# Patient Record
Sex: FEMALE | Race: WHITE
[De-identification: names, ages, dates, MRNs, and addresses within clinical notes are randomized per-mention and may not be internally consistent; named-entity substitution may affect disease eponyms.]

---

## 2022-04-29 PROBLEM — Z00.00 ENCOUNTER FOR PREVENTIVE HEALTH EXAMINATION: Status: ACTIVE | Noted: 2022-04-29

## 2022-05-09 ENCOUNTER — TRANSCRIPTION ENCOUNTER (OUTPATIENT)
Age: 27
End: 2022-05-09

## 2022-05-09 ENCOUNTER — ASOB RESULT (OUTPATIENT)
Age: 27
End: 2022-05-09

## 2022-05-09 ENCOUNTER — APPOINTMENT (OUTPATIENT)
Dept: ANTEPARTUM | Facility: CLINIC | Age: 27
End: 2022-05-09
Payer: MEDICAID

## 2022-05-09 PROCEDURE — 76812 OB US DETAILED ADDL FETUS: CPT

## 2022-05-09 PROCEDURE — 76811 OB US DETAILED SNGL FETUS: CPT

## 2022-05-18 ENCOUNTER — ASOB RESULT (OUTPATIENT)
Age: 27
End: 2022-05-18

## 2022-05-18 ENCOUNTER — APPOINTMENT (OUTPATIENT)
Dept: ANTEPARTUM | Facility: CLINIC | Age: 27
End: 2022-05-18
Payer: MEDICAID

## 2022-05-18 PROCEDURE — 76816 OB US FOLLOW-UP PER FETUS: CPT

## 2022-05-18 PROCEDURE — 76817 TRANSVAGINAL US OBSTETRIC: CPT

## 2022-06-09 ENCOUNTER — ASOB RESULT (OUTPATIENT)
Age: 27
End: 2022-06-09

## 2022-06-09 ENCOUNTER — APPOINTMENT (OUTPATIENT)
Dept: ANTEPARTUM | Facility: CLINIC | Age: 27
End: 2022-06-09
Payer: MEDICAID

## 2022-06-09 PROCEDURE — 76817 TRANSVAGINAL US OBSTETRIC: CPT

## 2022-06-09 PROCEDURE — 76816 OB US FOLLOW-UP PER FETUS: CPT

## 2022-07-07 ENCOUNTER — APPOINTMENT (OUTPATIENT)
Dept: ANTEPARTUM | Facility: CLINIC | Age: 27
End: 2022-07-07

## 2022-07-07 ENCOUNTER — ASOB RESULT (OUTPATIENT)
Age: 27
End: 2022-07-07

## 2022-07-07 PROCEDURE — 76816 OB US FOLLOW-UP PER FETUS: CPT

## 2022-07-07 PROCEDURE — 76819 FETAL BIOPHYS PROFIL W/O NST: CPT

## 2022-07-14 ENCOUNTER — APPOINTMENT (OUTPATIENT)
Dept: ANTEPARTUM | Facility: CLINIC | Age: 27
End: 2022-07-14

## 2022-07-14 ENCOUNTER — ASOB RESULT (OUTPATIENT)
Age: 27
End: 2022-07-14

## 2022-07-14 PROCEDURE — 76818 FETAL BIOPHYS PROFILE W/NST: CPT

## 2022-07-28 ENCOUNTER — ASOB RESULT (OUTPATIENT)
Age: 27
End: 2022-07-28

## 2022-07-28 ENCOUNTER — APPOINTMENT (OUTPATIENT)
Dept: ANTEPARTUM | Facility: CLINIC | Age: 27
End: 2022-07-28

## 2022-07-28 PROCEDURE — 76816 OB US FOLLOW-UP PER FETUS: CPT | Mod: 59

## 2022-07-29 ENCOUNTER — ASOB RESULT (OUTPATIENT)
Age: 27
End: 2022-07-29

## 2022-07-29 ENCOUNTER — APPOINTMENT (OUTPATIENT)
Dept: MATERNAL FETAL MEDICINE | Facility: CLINIC | Age: 27
End: 2022-07-29

## 2022-07-29 DIAGNOSIS — O99.810 ABNORMAL GLUCOSE COMPLICATING PREGNANCY: ICD-10-CM

## 2022-07-29 PROCEDURE — G0109 DIAB MANAGE TRN IND/GROUP: CPT | Mod: 95

## 2022-08-08 ENCOUNTER — ASOB RESULT (OUTPATIENT)
Age: 27
End: 2022-08-08

## 2022-08-08 ENCOUNTER — APPOINTMENT (OUTPATIENT)
Dept: MATERNAL FETAL MEDICINE | Facility: CLINIC | Age: 27
End: 2022-08-08

## 2022-08-08 PROCEDURE — G0108 DIAB MANAGE TRN  PER INDIV: CPT | Mod: 95

## 2022-08-15 ENCOUNTER — ASOB RESULT (OUTPATIENT)
Age: 27
End: 2022-08-15

## 2022-08-15 ENCOUNTER — APPOINTMENT (OUTPATIENT)
Dept: MATERNAL FETAL MEDICINE | Facility: CLINIC | Age: 27
End: 2022-08-15

## 2022-08-15 PROCEDURE — G0108 DIAB MANAGE TRN  PER INDIV: CPT | Mod: 95

## 2022-08-17 ENCOUNTER — ASOB RESULT (OUTPATIENT)
Age: 27
End: 2022-08-17

## 2022-08-17 ENCOUNTER — APPOINTMENT (OUTPATIENT)
Dept: ANTEPARTUM | Facility: CLINIC | Age: 27
End: 2022-08-17

## 2022-08-17 PROCEDURE — 76816 OB US FOLLOW-UP PER FETUS: CPT

## 2022-08-18 ENCOUNTER — OUTPATIENT (OUTPATIENT)
Dept: OUTPATIENT SERVICES | Facility: HOSPITAL | Age: 27
LOS: 1 days | End: 2022-08-18
Payer: SELF-PAY

## 2022-08-18 VITALS — OXYGEN SATURATION: 98 % | HEART RATE: 75 BPM | DIASTOLIC BLOOD PRESSURE: 75 MMHG | SYSTOLIC BLOOD PRESSURE: 138 MMHG

## 2022-08-18 VITALS
HEART RATE: 73 BPM | DIASTOLIC BLOOD PRESSURE: 64 MMHG | RESPIRATION RATE: 16 BRPM | SYSTOLIC BLOOD PRESSURE: 130 MMHG | TEMPERATURE: 98 F

## 2022-08-18 DIAGNOSIS — O26.899 OTHER SPECIFIED PREGNANCY RELATED CONDITIONS, UNSPECIFIED TRIMESTER: ICD-10-CM

## 2022-08-18 DIAGNOSIS — Z90.49 ACQUIRED ABSENCE OF OTHER SPECIFIED PARTS OF DIGESTIVE TRACT: Chronic | ICD-10-CM

## 2022-08-18 DIAGNOSIS — Z3A.00 WEEKS OF GESTATION OF PREGNANCY NOT SPECIFIED: ICD-10-CM

## 2022-08-18 PROCEDURE — 59025 FETAL NON-STRESS TEST: CPT | Mod: 26,U7

## 2022-08-18 PROCEDURE — G0463: CPT

## 2022-08-18 PROCEDURE — 59025 FETAL NON-STRESS TEST: CPT

## 2022-08-18 NOTE — OB PROVIDER TRIAGE NOTE - NSHPPHYSICALEXAM_GEN_ALL_CORE
Vital Signs Last 24 Hrs  T(C): 36.7 (18 Aug 2022 03:48), Max: 36.7 (18 Aug 2022 03:48)  T(F): 98.06 (18 Aug 2022 03:48), Max: 98.06 (18 Aug 2022 03:48)  HR: 75 (18 Aug 2022 04:18) (68 - 80)  BP: 132/76 (18 Aug 2022 03:53) (132/76 - 138/75)  BP(mean): --  RR: 16 (18 Aug 2022 03:48) (16 - 16)  SpO2: 97% (18 Aug 2022 04:18) (97% - 99%)    Gen: NAD  CV: NRRR  Lungs: CTA  Abd: soft, gravid, non-tender  SSE: -pooling, -nitrazine, -ferning  SVE: 2.5/60/-3  EFM: A: 145bpm, moderate variability, +accels, -decels  B: 150bpm, moderate variability, +accels, -decels  [place]: irregular, infrequent, pt denies painful contractions  BSUS: MVP A: 5 MVP B: 6

## 2022-08-18 NOTE — OB RN TRIAGE NOTE - FALL HARM RISK - UNIVERSAL INTERVENTIONS
Bed in lowest position, wheels locked, appropriate side rails in place/Call bell, personal items and telephone in reach/Instruct patient to call for assistance before getting out of bed or chair/Non-slip footwear when patient is out of bed/[place] to call system/Physically safe environment - no spills, clutter or unnecessary equipment/Purposeful Proactive Rounding/Room/bathroom lighting operational, light cord in reach

## 2022-08-18 NOTE — OB PROVIDER TRIAGE NOTE - HISTORY OF PRESENT ILLNESS
OB PA Triage Note    28 y/o  @35.2wks (LAUREN ) di/di TIUP presents for evaluation of LOF at 1245am. Pt states she was sleeping and woke up to go to the bathroom and noticed the bed sheets had a spot of wetness. When she was in the bathroom she felt a gush of fluid. Pt denies persistent LOF since initial gush while using the bathroom. Pt was 2.5cm in the office yesterday. Denies contractions, VB. +FM. GBS unknown. EFW per yael  A: 6#2, B: 7#    PNC: di/di TIUP, GDMA1  ObHx: 2015- , FT, 7#14  2016- , FT 8#14  2020- , FT, 8#12  2014- SAB  GynHx: Denies hx of fibroids, ovarian cysts, abnml PAP smears, STDs  MedHx: GDMA1. Anxiety. Denies hx of HTN, asthma, thyroid problems, blood clots/bleeding problems, hx of blood transfusions  Meds: PNV, Lexapro 20mg QD  All: NKDA  PSHx: 2019- laparoscopic cholecystectomy  FHx: Denies hx of blood clots/bleeding problems  Social: Denies alcohol/tobacco/drug use in pregnancy  Psych: Anxiety on Lexapro 20mg QD

## 2022-08-23 ENCOUNTER — ASOB RESULT (OUTPATIENT)
Age: 27
End: 2022-08-23

## 2022-08-23 ENCOUNTER — APPOINTMENT (OUTPATIENT)
Dept: ANTEPARTUM | Facility: CLINIC | Age: 27
End: 2022-08-23

## 2022-08-23 ENCOUNTER — APPOINTMENT (OUTPATIENT)
Dept: MATERNAL FETAL MEDICINE | Facility: CLINIC | Age: 27
End: 2022-08-23

## 2022-08-23 PROCEDURE — G0108 DIAB MANAGE TRN  PER INDIV: CPT | Mod: 95

## 2022-08-23 PROCEDURE — 76818 FETAL BIOPHYS PROFILE W/NST: CPT | Mod: 59

## 2022-08-25 ENCOUNTER — APPOINTMENT (OUTPATIENT)
Dept: ANTEPARTUM | Facility: CLINIC | Age: 27
End: 2022-08-25

## 2022-08-29 ENCOUNTER — APPOINTMENT (OUTPATIENT)
Dept: ANTEPARTUM | Facility: CLINIC | Age: 27
End: 2022-08-29

## 2022-08-29 ENCOUNTER — ASOB RESULT (OUTPATIENT)
Age: 27
End: 2022-08-29

## 2022-08-29 PROCEDURE — 76818 FETAL BIOPHYS PROFILE W/NST: CPT

## 2022-08-29 PROCEDURE — 76818 FETAL BIOPHYS PROFILE W/NST: CPT | Mod: 59

## 2022-09-05 ENCOUNTER — TRANSCRIPTION ENCOUNTER (OUTPATIENT)
Age: 27
End: 2022-09-05

## 2022-09-06 ENCOUNTER — INPATIENT (INPATIENT)
Facility: HOSPITAL | Age: 27
LOS: 2 days | Discharge: HOME | End: 2022-09-09
Attending: OBSTETRICS & GYNECOLOGY | Admitting: OBSTETRICS & GYNECOLOGY
Payer: MEDICAID

## 2022-09-06 VITALS — OXYGEN SATURATION: 98 % | SYSTOLIC BLOOD PRESSURE: 122 MMHG | HEART RATE: 80 BPM | DIASTOLIC BLOOD PRESSURE: 81 MMHG

## 2022-09-06 DIAGNOSIS — O30.009 TWIN PREGNANCY, UNSPECIFIED NUMBER OF PLACENTA AND UNSPECIFIED NUMBER OF AMNIOTIC SACS, UNSPECIFIED TRIMESTER: ICD-10-CM

## 2022-09-06 DIAGNOSIS — O30.043 TWIN PREGNANCY, DICHORIONIC/DIAMNIOTIC, THIRD TRIMESTER: ICD-10-CM

## 2022-09-06 DIAGNOSIS — Z90.49 ACQUIRED ABSENCE OF OTHER SPECIFIED PARTS OF DIGESTIVE TRACT: Chronic | ICD-10-CM

## 2022-09-06 DIAGNOSIS — O24.410 GESTATIONAL DIABETES MELLITUS IN PREGNANCY, DIET CONTROLLED: ICD-10-CM

## 2022-09-06 LAB
BASOPHILS # BLD AUTO: 0.03 K/UL — SIGNIFICANT CHANGE UP (ref 0–0.2)
BASOPHILS NFR BLD AUTO: 0.3 % — SIGNIFICANT CHANGE UP (ref 0–2)
BLD GP AB SCN SERPL QL: NEGATIVE — SIGNIFICANT CHANGE UP
COVID-19 SPIKE DOMAIN AB INTERP: POSITIVE
COVID-19 SPIKE DOMAIN ANTIBODY RESULT: >250 U/ML — HIGH
EOSINOPHIL # BLD AUTO: 0.33 K/UL — SIGNIFICANT CHANGE UP (ref 0–0.5)
EOSINOPHIL NFR BLD AUTO: 3.4 % — SIGNIFICANT CHANGE UP (ref 0–6)
GLUCOSE BLDC GLUCOMTR-MCNC: 81 MG/DL — SIGNIFICANT CHANGE UP (ref 70–99)
GLUCOSE BLDC GLUCOMTR-MCNC: 98 MG/DL — SIGNIFICANT CHANGE UP (ref 70–99)
HCT VFR BLD CALC: 35.2 % — SIGNIFICANT CHANGE UP (ref 34.5–45)
HGB BLD-MCNC: 11.3 G/DL — LOW (ref 11.5–15.5)
IMM GRANULOCYTES NFR BLD AUTO: 1.5 % — SIGNIFICANT CHANGE UP (ref 0–1.5)
LYMPHOCYTES # BLD AUTO: 2.01 K/UL — SIGNIFICANT CHANGE UP (ref 1–3.3)
LYMPHOCYTES # BLD AUTO: 20.8 % — SIGNIFICANT CHANGE UP (ref 13–44)
MCHC RBC-ENTMCNC: 27 PG — SIGNIFICANT CHANGE UP (ref 27–34)
MCHC RBC-ENTMCNC: 32.1 GM/DL — SIGNIFICANT CHANGE UP (ref 32–36)
MCV RBC AUTO: 84.2 FL — SIGNIFICANT CHANGE UP (ref 80–100)
MONOCYTES # BLD AUTO: 0.66 K/UL — SIGNIFICANT CHANGE UP (ref 0–0.9)
MONOCYTES NFR BLD AUTO: 6.8 % — SIGNIFICANT CHANGE UP (ref 2–14)
NEUTROPHILS # BLD AUTO: 6.5 K/UL — SIGNIFICANT CHANGE UP (ref 1.8–7.4)
NEUTROPHILS NFR BLD AUTO: 67.2 % — SIGNIFICANT CHANGE UP (ref 43–77)
NRBC # BLD: 0 /100 WBCS — SIGNIFICANT CHANGE UP (ref 0–0)
PLATELET # BLD AUTO: 262 K/UL — SIGNIFICANT CHANGE UP (ref 150–400)
RBC # BLD: 4.18 M/UL — SIGNIFICANT CHANGE UP (ref 3.8–5.2)
RBC # FLD: 14.6 % — HIGH (ref 10.3–14.5)
RH IG SCN BLD-IMP: NEGATIVE — SIGNIFICANT CHANGE UP
SARS-COV-2 IGG+IGM SERPL QL IA: >250 U/ML — HIGH
SARS-COV-2 IGG+IGM SERPL QL IA: POSITIVE
T PALLIDUM AB TITR SER: NEGATIVE — SIGNIFICANT CHANGE UP
WBC # BLD: 9.68 K/UL — SIGNIFICANT CHANGE UP (ref 3.8–10.5)
WBC # FLD AUTO: 9.68 K/UL — SIGNIFICANT CHANGE UP (ref 3.8–10.5)

## 2022-09-06 PROCEDURE — 88307 TISSUE EXAM BY PATHOLOGIST: CPT | Mod: 26

## 2022-09-06 PROCEDURE — 59412 ANTEPARTUM MANIPULATION: CPT

## 2022-09-06 RX ORDER — KETOROLAC TROMETHAMINE 30 MG/ML
30 SYRINGE (ML) INJECTION EVERY 6 HOURS
Refills: 0 | Status: DISCONTINUED | OUTPATIENT
Start: 2022-09-06 | End: 2022-09-07

## 2022-09-06 RX ORDER — AMPICILLIN TRIHYDRATE 250 MG
2 CAPSULE ORAL ONCE
Refills: 0 | Status: COMPLETED | OUTPATIENT
Start: 2022-09-06 | End: 2022-09-06

## 2022-09-06 RX ORDER — TETANUS TOXOID, REDUCED DIPHTHERIA TOXOID AND ACELLULAR PERTUSSIS VACCINE, ADSORBED 5; 2.5; 8; 8; 2.5 [IU]/.5ML; [IU]/.5ML; UG/.5ML; UG/.5ML; UG/.5ML
0.5 SUSPENSION INTRAMUSCULAR ONCE
Refills: 0 | Status: DISCONTINUED | OUTPATIENT
Start: 2022-09-06 | End: 2022-09-09

## 2022-09-06 RX ORDER — SODIUM CHLORIDE 9 MG/ML
1000 INJECTION, SOLUTION INTRAVENOUS
Refills: 0 | Status: DISCONTINUED | OUTPATIENT
Start: 2022-09-06 | End: 2022-09-07

## 2022-09-06 RX ORDER — SIMETHICONE 80 MG/1
80 TABLET, CHEWABLE ORAL EVERY 4 HOURS
Refills: 0 | Status: DISCONTINUED | OUTPATIENT
Start: 2022-09-06 | End: 2022-09-09

## 2022-09-06 RX ORDER — AMPICILLIN TRIHYDRATE 250 MG
1 CAPSULE ORAL EVERY 4 HOURS
Refills: 0 | Status: DISCONTINUED | OUTPATIENT
Start: 2022-09-06 | End: 2022-09-07

## 2022-09-06 RX ORDER — DIPHENHYDRAMINE HCL 50 MG
25 CAPSULE ORAL EVERY 6 HOURS
Refills: 0 | Status: COMPLETED | OUTPATIENT
Start: 2022-09-06 | End: 2023-08-05

## 2022-09-06 RX ORDER — OXYTOCIN 10 UNIT/ML
4 VIAL (ML) INJECTION
Qty: 30 | Refills: 0 | Status: DISCONTINUED | OUTPATIENT
Start: 2022-09-06 | End: 2022-09-07

## 2022-09-06 RX ORDER — OXYTOCIN 10 UNIT/ML
333.33 VIAL (ML) INJECTION
Qty: 20 | Refills: 0 | Status: DISCONTINUED | OUTPATIENT
Start: 2022-09-06 | End: 2022-09-09

## 2022-09-06 RX ORDER — OXYCODONE HYDROCHLORIDE 5 MG/1
5 TABLET ORAL ONCE
Refills: 0 | Status: DISCONTINUED | OUTPATIENT
Start: 2022-09-06 | End: 2022-09-09

## 2022-09-06 RX ORDER — HEPARIN SODIUM 5000 [USP'U]/ML
5000 INJECTION INTRAVENOUS; SUBCUTANEOUS EVERY 12 HOURS
Refills: 0 | Status: DISCONTINUED | OUTPATIENT
Start: 2022-09-06 | End: 2022-09-09

## 2022-09-06 RX ORDER — CEFAZOLIN SODIUM 1 G
2000 VIAL (EA) INJECTION ONCE
Refills: 0 | Status: DISCONTINUED | OUTPATIENT
Start: 2022-09-06 | End: 2022-09-09

## 2022-09-06 RX ORDER — CHLORHEXIDINE GLUCONATE 213 G/1000ML
1 SOLUTION TOPICAL ONCE
Refills: 0 | Status: DISCONTINUED | OUTPATIENT
Start: 2022-09-06 | End: 2022-09-07

## 2022-09-06 RX ORDER — ESCITALOPRAM OXALATE 10 MG/1
20 TABLET, FILM COATED ORAL DAILY
Refills: 0 | Status: DISCONTINUED | OUTPATIENT
Start: 2022-09-07 | End: 2022-09-07

## 2022-09-06 RX ORDER — LANOLIN
1 OINTMENT (GRAM) TOPICAL EVERY 6 HOURS
Refills: 0 | Status: DISCONTINUED | OUTPATIENT
Start: 2022-09-06 | End: 2022-09-09

## 2022-09-06 RX ORDER — CEFAZOLIN SODIUM 1 G
VIAL (EA) INJECTION
Refills: 0 | Status: DISCONTINUED | OUTPATIENT
Start: 2022-09-09 | End: 2022-09-09

## 2022-09-06 RX ORDER — ACETAMINOPHEN 500 MG
975 TABLET ORAL
Refills: 0 | Status: DISCONTINUED | OUTPATIENT
Start: 2022-09-06 | End: 2022-09-09

## 2022-09-06 RX ORDER — ONDANSETRON 8 MG/1
4 TABLET, FILM COATED ORAL EVERY 6 HOURS
Refills: 0 | Status: DISCONTINUED | OUTPATIENT
Start: 2022-09-06 | End: 2022-09-09

## 2022-09-06 RX ORDER — CITRIC ACID/SODIUM CITRATE 300-500 MG
15 SOLUTION, ORAL ORAL EVERY 6 HOURS
Refills: 0 | Status: DISCONTINUED | OUTPATIENT
Start: 2022-09-06 | End: 2022-09-07

## 2022-09-06 RX ORDER — NALBUPHINE HYDROCHLORIDE 10 MG/ML
2.5 INJECTION, SOLUTION INTRAMUSCULAR; INTRAVENOUS; SUBCUTANEOUS EVERY 6 HOURS
Refills: 0 | Status: DISCONTINUED | OUTPATIENT
Start: 2022-09-06 | End: 2022-09-09

## 2022-09-06 RX ORDER — IBUPROFEN 200 MG
600 TABLET ORAL EVERY 6 HOURS
Refills: 0 | Status: COMPLETED | OUTPATIENT
Start: 2022-09-06 | End: 2023-08-05

## 2022-09-06 RX ORDER — SODIUM CHLORIDE 9 MG/ML
1000 INJECTION INTRAMUSCULAR; INTRAVENOUS; SUBCUTANEOUS
Refills: 0 | Status: DISCONTINUED | OUTPATIENT
Start: 2022-09-06 | End: 2022-09-07

## 2022-09-06 RX ORDER — ACETAMINOPHEN 500 MG
1000 TABLET ORAL ONCE
Refills: 0 | Status: COMPLETED | OUTPATIENT
Start: 2022-09-06 | End: 2022-09-06

## 2022-09-06 RX ORDER — NALOXONE HYDROCHLORIDE 4 MG/.1ML
0.1 SPRAY NASAL
Refills: 0 | Status: DISCONTINUED | OUTPATIENT
Start: 2022-09-06 | End: 2022-09-09

## 2022-09-06 RX ORDER — DEXAMETHASONE 0.5 MG/5ML
4 ELIXIR ORAL EVERY 6 HOURS
Refills: 0 | Status: DISCONTINUED | OUTPATIENT
Start: 2022-09-06 | End: 2022-09-09

## 2022-09-06 RX ORDER — MAGNESIUM HYDROXIDE 400 MG/1
30 TABLET, CHEWABLE ORAL
Refills: 0 | Status: DISCONTINUED | OUTPATIENT
Start: 2022-09-06 | End: 2022-09-09

## 2022-09-06 RX ORDER — CEFAZOLIN SODIUM 1 G
2000 VIAL (EA) INJECTION EVERY 8 HOURS
Refills: 0 | Status: COMPLETED | OUTPATIENT
Start: 2022-09-07 | End: 2022-09-09

## 2022-09-06 RX ORDER — MORPHINE SULFATE 50 MG/1
2 CAPSULE, EXTENDED RELEASE ORAL ONCE
Refills: 0 | Status: DISCONTINUED | OUTPATIENT
Start: 2022-09-06 | End: 2022-09-08

## 2022-09-06 RX ORDER — OXYCODONE HYDROCHLORIDE 5 MG/1
5 TABLET ORAL
Refills: 0 | Status: COMPLETED | OUTPATIENT
Start: 2022-09-06 | End: 2022-09-13

## 2022-09-06 RX ADMIN — Medication 108 GRAM(S): at 21:06

## 2022-09-06 RX ADMIN — Medication 4 MILLIUNIT(S)/MIN: at 13:51

## 2022-09-06 RX ADMIN — Medication 200 GRAM(S): at 11:10

## 2022-09-06 RX ADMIN — SODIUM CHLORIDE 125 MILLILITER(S): 9 INJECTION INTRAMUSCULAR; INTRAVENOUS; SUBCUTANEOUS at 11:10

## 2022-09-06 RX ADMIN — Medication 0.25 MILLIGRAM(S): at 20:27

## 2022-09-06 RX ADMIN — Medication 400 MILLIGRAM(S): at 23:16

## 2022-09-06 RX ADMIN — Medication 108 GRAM(S): at 17:00

## 2022-09-06 NOTE — PRE-ANESTHESIA EVALUATION ADULT - NSANTHPMHFT_GEN_ALL_CORE
Twin pregnancy  No cardiac or pulmonary disease  No bleeding or clotting disorders  No issues with prior epidurals

## 2022-09-06 NOTE — CHART NOTE - NSCHARTNOTEFT_GEN_A_CORE
MFM Attending    Called from home to come to [facility] L&D A to assist with possible external cephalic version of Twin B.  Upon arrival, FHT reactive and reassuring.  Bedside sono demonstrated fetus in transverse, head maternal left position.  After obtaining informed verbal consent from patient and with Dr. Serrato, a gentle ECV was attempted x 4, with resultant vertex presentation with fetal arm/hand adjacent to head.  Throughout the entire ECV, no visible decelerations noted.  Upon confirmation of vertex, fetal heart noted to be in 70s-80s x 1 minute, with recovery to 120s-130s.  Please see Dr. Serrato's note for further labor events thereafter.    LYDIA Helton MD

## 2022-09-06 NOTE — OB PROVIDER DELIVERY SUMMARY - NSSELHIDDEN_OBGYN_ALL_OB_FT
[NS_DeliveryAttending1_OBGYN_ALL_OB_FT:[email or web address]=],[NS_DeliveryRN_OBGYN_ALL_OB_FT:[id number]=]

## 2022-09-06 NOTE — CHART NOTE - NSCHARTNOTEFT_GEN_A_CORE
OBGYN Note:    Pt comfortable.     Difficulty tracing both babies simultaneously. YENIFER King at bedside with ultrasound.  Baby A cephalic, with  reactive.  Baby B is maternal upper left with back down, active.     Pitocin at 8 mu, unable to increase due to discontinuity.      Pt repositioned, will attempt to trace both.     Further alternatives to be discussed as needed.    d/w Dr. bonilla.  ADAM Vásquez

## 2022-09-06 NOTE — OB RN PATIENT PROFILE - NS_NPO_OBGYN_ALL_OB_DT
"Requested Prescriptions   Pending Prescriptions Disp Refills     losartan-hydrochlorothiazide (HYZAAR) 100-25 MG per tablet [Pharmacy Med Name: LOSARTAN/HCTZ 100/25MG TABLETS] 30 tablet 0    Last Written Prescription Date:  10-19-18  Last Fill Quantity: 30,  # refills: 0   Last office visit: 8/20/2018 with prescribing provider:     Future Office Visit:     Sig: TAKE 1 TABLET BY MOUTH EVERY DAY.    Angiotensin-II Receptors Failed    11/17/2018  4:27 PM       Failed - Normal serum creatinine on file in past 12 months    Recent Labs   Lab Test  08/20/18   1432   CR  1.37*            Passed - Blood pressure under 140/90 in past 12 months    BP Readings from Last 3 Encounters:   11/28/17 133/89   05/16/17 122/76   04/04/17 (!) 137/91                Passed - Recent (12 mo) or future (30 days) visit within the authorizing provider's specialty    Patient had office visit in the last 12 months or has a visit in the next 30 days with authorizing provider or within the authorizing provider's specialty.  See \"Patient Info\" tab in inbasket, or \"Choose Columns\" in Meds & Orders section of the refill encounter.             Passed - Patient is age 18 or older       Passed - No active pregnancy on record       Passed - Normal serum potassium on file in past 12 months    Recent Labs   Lab Test  08/20/18   1432   POTASSIUM  4.2                   Passed - No positive pregnancy test in past 12 months          "
Routing refill request to provider for review/approval because:  RX Protocol Failed.  Please review refill.  Thank you.  Kat Mishra RN        
06-Sep-2022 08:30

## 2022-09-06 NOTE — OB NEONATOLOGY/PEDIATRICIAN DELIVERY SUMMARY - NSPEDSNEONOTESB_OBGYN_ALL_OB_FT
Peds called to OR for twin delivery. Twin B- 38 wk male born via C/S to a 30 y/o  mother.  Prenatal history of  X3 Mix x1, GDM- diet controlled. Twin A delivered via  ~2hours prior, after which Twin B was noted to be transverse with head maternal left position, ECV x 4, resulted in vertex presentation, no decelerations during ECV, but upon confirmation of vertex FHR in 70s-80s x1min, recovery to 120s-130s. Crash C/S was then initiated. Maternal labs include Blood Type O-, HIV - , RPR NR , Rubella I , Hep B - , GBS +, COVID pending, AROM at *** with clear fluids (ROM hours: ). Baby emerged was warmed, dried suctioned and stimulated with APGARS of 7/8. Resuscitation included: CPAP at ~3 minutes of life ( 21% to 31%), baby then transported to NICU on nasal canula. Mom plans to initiate breastfeeding + formula feed, declines Hep B vaccine. Highest maternal temp: 37 EOS *** Peds + NICU called to OR for twin delivery. Twin B- 38 wk male born via C/S to a 30 y/o  mother.  Prenatal history of  X3 Mix x1, GDM- diet controlled. Twin A delivered via  ~2hours prior, after which Twin B was noted to be transverse with head maternal left position, ECV x 4 resulted in vertex presentation, no decelerations during ECV, but upon confirmation of vertex FHR in 70s-80s x1min, recovery to 120s-130s. Crash C/S was then initiated. Maternal labs include Blood Type O-, HIV - , RPR NR , Rubella I , Hep B - , GBS + (Amp x2), COVID pending, AROM at 21:26 on 22 with clear fluids (ROM: 13M). Baby emerged was warmed, dried suctioned and stimulated with APGARS of 7/8. Resuscitation included: CPAP at ~3 minutes of life ( 21% to 31%), baby then transported to NICU on nasal canula. Mom plans to initiate breastfeeding + formula feed, declines Hep B vaccine. Highest maternal temp: 37 EOS 0.04.

## 2022-09-06 NOTE — OB PROVIDER H&P - NSHPPHYSICALEXAM_GEN_ALL_CORE
ICU Vital Signs Last 24 Hrs  T(C): 37 (06 Sep 2022 10:28), Max: 37 (06 Sep 2022 10:28)  T(F): 98.6 (06 Sep 2022 10:28), Max: 98.6 (06 Sep 2022 10:28)  HR: 75 (06 Sep 2022 11:03) (70 - 80)  BP: 122/81 (06 Sep 2022 10:28) (122/81 - 122/81)  RR: 18 (06 Sep 2022 10:28) (18 - 18)  SpO2: 98% (06 Sep 2022 11:03) (91% - 98%)    O2 Parameters below as of 06 Sep 2022 10:28  Patient On (Oxygen Delivery Method): room air    gen: NAD   cards: clear S1S2  RRR  pulm: CTA b/l  abd: soft, gravid.  ve: 3/60/-3 by dr. bonilla

## 2022-09-06 NOTE — OB RN INTRAOPERATIVE NOTE - NSSELHIDDEN_OBGYN_ALL_OB_FT
[NS_DeliveryAttending1_OBGYN_ALL_OB_FT:[email or web address]=],[NS_DeliveryRN_OBGYN_ALL_OB_FT:[id number]=] [NS_DeliveryAttending1_OBGYN_ALL_OB_FT:[email or web address]=],[NS_DeliveryRN_OBGYN_ALL_OB_FT:[id number]=],[NS_DeliveryAssist1_OBGYN_ALL_OB_FT:[id number]=]

## 2022-09-06 NOTE — OB RN DELIVERY SUMMARY - AS DELIV COMPLICATIONS OB
cord prolapse of baby B after version/cord prolapse cord prolapse of baby B after AROM/cord prolapse

## 2022-09-06 NOTE — OB NEONATOLOGY/PEDIATRICIAN DELIVERY SUMMARY - NSPEDSNEONOTESA_OBGYN_ALL_OB_FT
Peds called to OR for twin delivery. Twin A - 38 wk male born via  to a 30 y/o  mother.  Prenatal history of  X3 Mix x1, GDM- diet controlled. Maternal labs include Blood Type O-, HIV - , RPR NR , Rubella I , Hep B - , GBS +, COVID pending, AROM at 1710 with clear (ROM hours: 3). Baby emerged vigorous, crying, was warmed, dried suctioned and stimulated with APGARS of 8/9 . Resuscitation included: bulb syringe . Mom plans to initiate breastfeeding / formula feed, declines Hep B vaccine. Highest maternal temp: 37 EOS 0.07 . Peds called to OR for twin delivery. Twin A - 38 wk male born via  to a 28 y/o  mother.  Prenatal history of  X3 Mix x1, GDM- diet controlled. Maternal labs include Blood Type O-, HIV - , RPR NR , Rubella I , Hep B - , GBS +, COVID pending, AROM at 1710 with clear (ROM hours: 3). Baby emerged vigorous, crying, was warmed, dried suctioned and stimulated with APGARS of 8/9 . Resuscitation included: bulb syringe . Mom plans to initiate breastfeeding + formula feed, declines Hep B vaccine. Highest maternal temp: 37 EOS 0.07 . Peds called to OR for twin delivery. Twin A - 38 wk male born via  to a 30 y/o  mother.  Prenatal history of  X3 Mix x1, GDM- diet controlled. Maternal labs include Blood Type O-, HIV - , RPR NR , Rubella I , Hep B - , GBS + (amp x 2), COVID pending, AROM at 1710 with clear (ROM hours: 3). Baby emerged vigorous, crying, was warmed, dried suctioned and stimulated with APGARS of 8/9 . Resuscitation included: bulb syringe . Mom plans to initiate breastfeeding + formula feed, declines Hep B vaccine. Highest maternal temp: 37 EOS 0.07 . Peds called to OR for twin delivery. Twin A - 38 wk male born via  to a 28 y/o  mother.  Prenatal history of  X3 Mix x1, GDM- diet controlled. Maternal labs include Blood Type O-, HIV - , RPR NR , Rubella I , Hep B - , GBS + (amp x 2), COVID pending, AROM at 1710 with clear (ROM hours: 3). Baby emerged vigorous, crying, was warmed, dried suctioned and stimulated with APGARS of 8/9 . Resuscitation included: bulb syringe . Mom plans to initiate breastfeeding + formula feed, declines Hep B vaccine. Highest maternal temp: 37 EOS 0.07 .    Physical Exam:  Gen: no acute distress, +grimace  HEENT:  anterior fontanel open soft and flat, nondysmoprhic facies, no cleft lip/palate, ears normal set, no ear pits or tags, nares clinically patent  Resp: Normal respiratory effort without grunting or retractions, good air entry b/l, clear to auscultation bilaterally  Cardio: Present S1/S2, regular rate and rhythm, no murmurs  Abd: soft, non tender, non distended, umbilical cord with 3 vessels  Neuro: +palmar and plantar grasp, +suck, +fox, normal tone  Extremities: negative tamayo and ortolani maneuvers, moving all extremities, no clavicular crepitus or stepoff  Skin: pink, warm  Genitals: Normal male anatomy, testicles palpable in scrotum b/l, Choco 1, anus patent

## 2022-09-06 NOTE — OB PROVIDER H&P - HISTORY OF PRESENT ILLNESS
26 y/o  @35.2wks (LAUREN ) di/di TIUP presents for induction. Pt feels well and has no complaints. Pt denies LOF, VB, ctx/cramping. Pt was 3 cm in the office yesterday. +FM. GBS positive. EFW per sono  A: 6#2, B: 7#, vertex/vertex.     PNC: di/di TIUP, GDMA1    ObHx: - , FT, 7#14  2016- , FT 8#14  - , FT, 8#12  2014- SAB  GynHx: Denies hx of fibroids, ovarian cysts, abnml PAP smears, STDs  MedHx: GDMA1. Anxiety. Denies hx of HTN, asthma, thyroid problems, blood clots/bleeding problems, hx of blood transfusions  Meds: PNV, Lexapro 20mg QD  All: NKDA  PSHx: 2019- laparoscopic cholecystectomy  FHx: Denies hx of blood clots/bleeding problems  Social: Denies alcohol/tobacco/drug use in pregnancy  Psych: Anxiety on Lexapro 20mg QD

## 2022-09-06 NOTE — OB RN DELIVERY SUMMARY - NS_LABORCHARACTER_OBGYN_ALL_OB
External electronic FM Induction of labor-AROM/Augmentation of labor/External electronic FM/Antibiotics in labor

## 2022-09-06 NOTE — OB RN DELIVERY SUMMARY - NS_GENERALBABYACOMMENTA_OBGYN_ALL_OB_FT
baby A was given to father to hold while we had to do a  crash for MOM to deliver baby B AFTER A CORD PROLAPSE

## 2022-09-06 NOTE — OB RN DELIVERY SUMMARY - NSSELHIDDEN_OBGYN_ALL_OB_FT
[NS_DeliveryAttending1_OBGYN_ALL_OB_FT:[email or web address]=],[NS_DeliveryRN_OBGYN_ALL_OB_FT:[id number]] [NS_DeliveryAttending1_OBGYN_ALL_OB_FT:[email or web address]=],[NS_DeliveryRN_OBGYN_ALL_OB_FT:[id number]=],[NS_DeliveryAssist1_OBGYN_ALL_OB_FT:[id number]=]

## 2022-09-06 NOTE — PRE-ANESTHESIA EVALUATION ADULT - NSANTHOSAYNRD_GEN_A_CORE
No. PEBBLES screening performed.  STOP BANG Legend: 0-2 = LOW Risk; 3-4 = INTERMEDIATE Risk; 5-8 = HIGH Risk

## 2022-09-06 NOTE — OB PROVIDER DELIVERY SUMMARY - NSPROVIDERDELIVERYNOTE_OBGYN_ALL_OB_FT
of viable male infanmt apgar 8/9 weight 6-12 OA. then- presentation of B flipped from compound with vertex and arm/hand to transverse lkie to breech. Dr Helton came in to din an external version. version was successful but the vertex came down with an arm and hand and a funic presentation. vertex was too high to deliver ahead of the cord , and then the fetal heart rate decelerated and an emergency section was done for cord prolapse. baby B weight 7-11 apgar 7/8 was delivered from the vertex presentation with a knuckle of cord just below it. surgery went well, with uterus closed in two layers. ebl 800 cc. small second degree laceration repaired after the c/s.  of viable male infant apgar 8/9 weight 6-12 OA. then- presentation of B flipped from compound with vertex and arm/hand to transverse lie to breech. Dr Helton came in to do an external version. version was successful but the vertex came down with an arm and hand and a funic presentation. vertex was too high to deliver ahead of the cord , and then the fetal heart rate decelerated and an emergency section was done for cord prolapse. baby B weight 7-11 apgar 7/8 was delivered from the vertex presentation with a knuckle of cord just below it. surgery went well, with uterus closed in two layers. ebl 800 cc. small second degree laceration repaired after the c/s.    Procedure: pLTCS  Preop Dx: (1) Di/Di Twin IUP @ 38w status post  of Baby A (2) Cord prolapse   EBL:  800ml     QBL: 556ml  IVF:  1.6L crystalloids  UOP: 150ml  Layers of uterine closure: two   Complications: none  Specimen: placenta x2 to pathology  Findings: normal appearing uterus & fallopian tubes, ovaries not visualized   Hemostatic/Intraoperative agents: TXA x1, IM Pit  Baby A (delivered prior to c/s via ): Male, vtx, Apgars 8/9, 3490g  Baby B: Male, transverse, Apgars 7/8,  3070g    Bre Decker MD  OBGYN PGY4  of viable male infant apgar 8/9 weight 6-12 OA. then- presentation of B flipped from compound with vertex and arm/hand to transverse lie to breech. Dr Helton came in to do an external version. version was successful but the vertex came down with an arm and hand and a funic presentation. vertex was too high to deliver ahead of the cord , and then the fetal heart rate decelerated and an emergency section was done for cord prolapse. baby B weight 7-11 apgar 7/8 was delivered from the vertex presentation with a knuckle of cord just below it. surgery went well, with uterus closed in two layers. ebl 800 cc. small second degree laceration repaired after the c/s.    Procedure: pLTCS  Preop Dx: (1) Di/Di Twin IUP @ 38w status post  of Baby A (2) Cord prolapse   EBL:  800ml     QBL: 556ml  IVF:  1.6L crystalloids  UOP: 150ml  Layers of uterine closure: two   Complications: none  Specimen: placenta x2 to pathology  Findings: normal appearing uterus & fallopian tubes, ovaries not visualized   Hemostatic/Intraoperative agents: TXA x1, IM Pit  Baby A (delivered prior to c/s via ): Male, vtx, Apgars 8/9, 3490g  Baby B: Male, transverse, Apgars 7/8,  3070g  Dictation#[id number]    Bre Decker MD  OBGYN PGY4

## 2022-09-06 NOTE — OB PROVIDER H&P - PROBLEM SELECTOR PLAN 1
Admit  Routine labs  IV access and IV fluids  Bicitra, Pepcid  Amp for GBS ppx  pitocin 4x4 mu to begin 1 hour after ampicillin infusion.  expectant .    Plan per Dr. Serrato.  ADAM Vásquez

## 2022-09-06 NOTE — OB RN DELIVERY SUMMARY - NS_SEPSISRSKCALC_OBGYN_ALL_OB_FT
EOS calculated successfully. EOS Risk Factor: 0.5/1000 live births ([facility] national incidence); GA=38w;Temp=98.6; ROM=2.917; GBS='Positive'; Antibiotics='Broad spectrum antibiotics > 4 hrs prior to birth'

## 2022-09-07 LAB
BASOPHILS # BLD AUTO: 0 K/UL — SIGNIFICANT CHANGE UP (ref 0–0.2)
BASOPHILS NFR BLD AUTO: 0 % — SIGNIFICANT CHANGE UP (ref 0–2)
EOSINOPHIL # BLD AUTO: 0 K/UL — SIGNIFICANT CHANGE UP (ref 0–0.5)
EOSINOPHIL NFR BLD AUTO: 0 % — SIGNIFICANT CHANGE UP (ref 0–6)
GIANT PLATELETS BLD QL SMEAR: PRESENT — SIGNIFICANT CHANGE UP
HCT VFR BLD CALC: 34 % — LOW (ref 34.5–45)
HGB BLD-MCNC: 10.9 G/DL — LOW (ref 11.5–15.5)
KLEIHAUER-BETKE CALCULATION: 0 % — SIGNIFICANT CHANGE UP (ref 0–0.3)
LYMPHOCYTES # BLD AUTO: 0.49 K/UL — LOW (ref 1–3.3)
LYMPHOCYTES # BLD AUTO: 2.6 % — LOW (ref 13–44)
MANUAL SMEAR VERIFICATION: SIGNIFICANT CHANGE UP
MCHC RBC-ENTMCNC: 27.5 PG — SIGNIFICANT CHANGE UP (ref 27–34)
MCHC RBC-ENTMCNC: 32.1 GM/DL — SIGNIFICANT CHANGE UP (ref 32–36)
MCV RBC AUTO: 85.9 FL — SIGNIFICANT CHANGE UP (ref 80–100)
MONOCYTES # BLD AUTO: 0.66 K/UL — SIGNIFICANT CHANGE UP (ref 0–0.9)
MONOCYTES NFR BLD AUTO: 3.5 % — SIGNIFICANT CHANGE UP (ref 2–14)
NEUTROPHILS # BLD AUTO: 17.74 K/UL — HIGH (ref 1.8–7.4)
NEUTROPHILS NFR BLD AUTO: 89.6 % — HIGH (ref 43–77)
NEUTS BAND # BLD: 4.3 % — SIGNIFICANT CHANGE UP (ref 0–8)
PLAT MORPH BLD: NORMAL — SIGNIFICANT CHANGE UP
PLATELET # BLD AUTO: 255 K/UL — SIGNIFICANT CHANGE UP (ref 150–400)
POLYCHROMASIA BLD QL SMEAR: SLIGHT — SIGNIFICANT CHANGE UP
RBC # BLD: 3.96 M/UL — SIGNIFICANT CHANGE UP (ref 3.8–5.2)
RBC # FLD: 14.4 % — SIGNIFICANT CHANGE UP (ref 10.3–14.5)
RBC BLD AUTO: SIGNIFICANT CHANGE UP
SARS-COV-2 RNA SPEC QL NAA+PROBE: SIGNIFICANT CHANGE UP
WBC # BLD: 18.89 K/UL — HIGH (ref 3.8–10.5)
WBC # FLD AUTO: 18.89 K/UL — HIGH (ref 3.8–10.5)

## 2022-09-07 RX ORDER — ESCITALOPRAM OXALATE 10 MG/1
20 TABLET, FILM COATED ORAL DAILY
Refills: 0 | Status: DISCONTINUED | OUTPATIENT
Start: 2022-09-07 | End: 2022-09-09

## 2022-09-07 RX ORDER — OXYCODONE HYDROCHLORIDE 5 MG/1
5 TABLET ORAL
Refills: 0 | Status: DISCONTINUED | OUTPATIENT
Start: 2022-09-07 | End: 2022-09-09

## 2022-09-07 RX ORDER — ESCITALOPRAM OXALATE 10 MG/1
20 TABLET, FILM COATED ORAL AT BEDTIME
Refills: 0 | Status: DISCONTINUED | OUTPATIENT
Start: 2022-09-07 | End: 2022-09-07

## 2022-09-07 RX ADMIN — Medication 100 MILLIGRAM(S): at 21:17

## 2022-09-07 RX ADMIN — Medication 30 MILLIGRAM(S): at 05:16

## 2022-09-07 RX ADMIN — Medication 100 MILLIGRAM(S): at 13:01

## 2022-09-07 RX ADMIN — OXYCODONE HYDROCHLORIDE 5 MILLIGRAM(S): 5 TABLET ORAL at 00:47

## 2022-09-07 RX ADMIN — Medication 975 MILLIGRAM(S): at 14:00

## 2022-09-07 RX ADMIN — OXYCODONE HYDROCHLORIDE 5 MILLIGRAM(S): 5 TABLET ORAL at 21:50

## 2022-09-07 RX ADMIN — Medication 100 MILLIGRAM(S): at 05:22

## 2022-09-07 RX ADMIN — HEPARIN SODIUM 5000 UNIT(S): 5000 INJECTION INTRAVENOUS; SUBCUTANEOUS at 17:28

## 2022-09-07 RX ADMIN — Medication 975 MILLIGRAM(S): at 06:34

## 2022-09-07 RX ADMIN — Medication 30 MILLIGRAM(S): at 18:30

## 2022-09-07 RX ADMIN — HEPARIN SODIUM 5000 UNIT(S): 5000 INJECTION INTRAVENOUS; SUBCUTANEOUS at 05:18

## 2022-09-07 RX ADMIN — Medication 30 MILLIGRAM(S): at 05:45

## 2022-09-07 RX ADMIN — Medication 975 MILLIGRAM(S): at 12:59

## 2022-09-07 RX ADMIN — Medication 975 MILLIGRAM(S): at 21:14

## 2022-09-07 RX ADMIN — OXYCODONE HYDROCHLORIDE 5 MILLIGRAM(S): 5 TABLET ORAL at 21:17

## 2022-09-07 RX ADMIN — Medication 30 MILLIGRAM(S): at 11:18

## 2022-09-07 RX ADMIN — Medication 30 MILLIGRAM(S): at 23:42

## 2022-09-07 RX ADMIN — ESCITALOPRAM OXALATE 20 MILLIGRAM(S): 10 TABLET, FILM COATED ORAL at 01:14

## 2022-09-07 RX ADMIN — ESCITALOPRAM OXALATE 20 MILLIGRAM(S): 10 TABLET, FILM COATED ORAL at 23:42

## 2022-09-07 RX ADMIN — Medication 30 MILLIGRAM(S): at 17:28

## 2022-09-07 RX ADMIN — Medication 975 MILLIGRAM(S): at 20:46

## 2022-09-07 RX ADMIN — Medication 30 MILLIGRAM(S): at 12:00

## 2022-09-08 RX ORDER — IBUPROFEN 200 MG
600 TABLET ORAL EVERY 6 HOURS
Refills: 0 | Status: DISCONTINUED | OUTPATIENT
Start: 2022-09-08 | End: 2022-09-09

## 2022-09-08 RX ORDER — OXYCODONE HYDROCHLORIDE 5 MG/1
5 TABLET ORAL ONCE
Refills: 0 | Status: DISCONTINUED | OUTPATIENT
Start: 2022-09-08 | End: 2022-09-08

## 2022-09-08 RX ADMIN — Medication 600 MILLIGRAM(S): at 12:54

## 2022-09-08 RX ADMIN — HEPARIN SODIUM 5000 UNIT(S): 5000 INJECTION INTRAVENOUS; SUBCUTANEOUS at 18:09

## 2022-09-08 RX ADMIN — OXYCODONE HYDROCHLORIDE 5 MILLIGRAM(S): 5 TABLET ORAL at 06:24

## 2022-09-08 RX ADMIN — Medication 975 MILLIGRAM(S): at 20:58

## 2022-09-08 RX ADMIN — HEPARIN SODIUM 5000 UNIT(S): 5000 INJECTION INTRAVENOUS; SUBCUTANEOUS at 05:57

## 2022-09-08 RX ADMIN — Medication 600 MILLIGRAM(S): at 18:08

## 2022-09-08 RX ADMIN — Medication 100 MILLIGRAM(S): at 14:26

## 2022-09-08 RX ADMIN — Medication 975 MILLIGRAM(S): at 21:58

## 2022-09-08 RX ADMIN — OXYCODONE HYDROCHLORIDE 5 MILLIGRAM(S): 5 TABLET ORAL at 22:31

## 2022-09-08 RX ADMIN — Medication 600 MILLIGRAM(S): at 05:57

## 2022-09-08 RX ADMIN — ESCITALOPRAM OXALATE 20 MILLIGRAM(S): 10 TABLET, FILM COATED ORAL at 23:38

## 2022-09-08 RX ADMIN — Medication 600 MILLIGRAM(S): at 13:45

## 2022-09-08 RX ADMIN — Medication 100 MILLIGRAM(S): at 06:03

## 2022-09-08 RX ADMIN — Medication 100 MILLIGRAM(S): at 20:25

## 2022-09-08 RX ADMIN — OXYCODONE HYDROCHLORIDE 5 MILLIGRAM(S): 5 TABLET ORAL at 18:08

## 2022-09-08 RX ADMIN — OXYCODONE HYDROCHLORIDE 5 MILLIGRAM(S): 5 TABLET ORAL at 23:31

## 2022-09-08 RX ADMIN — Medication 975 MILLIGRAM(S): at 02:12

## 2022-09-08 RX ADMIN — OXYCODONE HYDROCHLORIDE 5 MILLIGRAM(S): 5 TABLET ORAL at 11:30

## 2022-09-08 RX ADMIN — Medication 30 MILLIGRAM(S): at 00:15

## 2022-09-08 RX ADMIN — OXYCODONE HYDROCHLORIDE 5 MILLIGRAM(S): 5 TABLET ORAL at 10:50

## 2022-09-08 RX ADMIN — Medication 600 MILLIGRAM(S): at 06:54

## 2022-09-08 RX ADMIN — Medication 975 MILLIGRAM(S): at 10:00

## 2022-09-08 RX ADMIN — Medication 975 MILLIGRAM(S): at 09:02

## 2022-09-08 RX ADMIN — Medication 975 MILLIGRAM(S): at 02:43

## 2022-09-08 RX ADMIN — OXYCODONE HYDROCHLORIDE 5 MILLIGRAM(S): 5 TABLET ORAL at 20:40

## 2022-09-08 RX ADMIN — Medication 975 MILLIGRAM(S): at 16:15

## 2022-09-08 RX ADMIN — OXYCODONE HYDROCHLORIDE 5 MILLIGRAM(S): 5 TABLET ORAL at 19:40

## 2022-09-08 RX ADMIN — Medication 975 MILLIGRAM(S): at 15:38

## 2022-09-08 NOTE — PROGRESS NOTE ADULT - ASSESSMENT
A/P:  26 y/o P5 with Di-Di TIUP now POD#2 s/p  Twin A c/b cord prolapse of twin B s/p pLTCS    PMHx:  Current Issues:    Increase OOB  PO Pain Protocol  Continue Regular Diet  Continue Routine Postop/Postpartum Care    Trudi Schwartz PA-C

## 2022-09-08 NOTE — CHART NOTE - NSCHARTNOTEFT_GEN_A_CORE
Patient seen for: nutrition consult for GDM postpartum education prior to discharge    Source: patient, EMR    Patient is a 28yo female POD#2 s/p  Twin A c/b cord prolapse of twin B s/p pLTCS   Admission date:   Antepartum course significant for GDMA1   Pt plans on breastfeeding infants    Chart reviewed, events noted. Meds/labs reviewed.    Nutrition Diagnosis:   Food and Nutrition Related Knowledge Deficit related to knowledge of post-partum GDM nutrition recommendations as evidenced by antepartum course complicated by GDM, now post-partum.   Goal: Pt able to teach back 2 points of nutrition education provided.     Nutrition Intervention:  Pt on phone at time of visit and deferred verbal diet education at this time accepted After delivery GDM education handout, including information on:   1) Increased risk of developing T2DM with GDM and ways to help prevent development  2) 4-12 week fasting oral glucose tolerance test follow-up as outpatient  3) General healthful diet and physical activity recommendations discussed  4) Increased energy needs with breastfeeding    Pt made aware RD remains available.     Monitoring:  No other nutrition risks were identified during this encounter.  RD remains available upon request and will follow up per protocol.    Becca Bhatt MS, RD CDN [facility] Pager #[phone]

## 2022-09-09 ENCOUNTER — TRANSCRIPTION ENCOUNTER (OUTPATIENT)
Age: 27
End: 2022-09-09

## 2022-09-09 VITALS
HEART RATE: 66 BPM | RESPIRATION RATE: 18 BRPM | SYSTOLIC BLOOD PRESSURE: 134 MMHG | OXYGEN SATURATION: 98 % | TEMPERATURE: 98 F | DIASTOLIC BLOOD PRESSURE: 74 MMHG

## 2022-09-09 LAB
BASOPHILS # BLD AUTO: 0.03 K/UL — SIGNIFICANT CHANGE UP (ref 0–0.2)
BASOPHILS NFR BLD AUTO: 0.3 % — SIGNIFICANT CHANGE UP (ref 0–2)
EOSINOPHIL # BLD AUTO: 0.22 K/UL — SIGNIFICANT CHANGE UP (ref 0–0.5)
EOSINOPHIL NFR BLD AUTO: 2.4 % — SIGNIFICANT CHANGE UP (ref 0–6)
HCT VFR BLD CALC: 30.3 % — LOW (ref 34.5–45)
HGB BLD-MCNC: 9.5 G/DL — LOW (ref 11.5–15.5)
IMM GRANULOCYTES NFR BLD AUTO: 2.4 % — HIGH (ref 0–1.5)
LYMPHOCYTES # BLD AUTO: 2.29 K/UL — SIGNIFICANT CHANGE UP (ref 1–3.3)
LYMPHOCYTES # BLD AUTO: 24.7 % — SIGNIFICANT CHANGE UP (ref 13–44)
MCHC RBC-ENTMCNC: 27.1 PG — SIGNIFICANT CHANGE UP (ref 27–34)
MCHC RBC-ENTMCNC: 31.4 GM/DL — LOW (ref 32–36)
MCV RBC AUTO: 86.3 FL — SIGNIFICANT CHANGE UP (ref 80–100)
MONOCYTES # BLD AUTO: 0.49 K/UL — SIGNIFICANT CHANGE UP (ref 0–0.9)
MONOCYTES NFR BLD AUTO: 5.3 % — SIGNIFICANT CHANGE UP (ref 2–14)
NEUTROPHILS # BLD AUTO: 6.02 K/UL — SIGNIFICANT CHANGE UP (ref 1.8–7.4)
NEUTROPHILS NFR BLD AUTO: 64.9 % — SIGNIFICANT CHANGE UP (ref 43–77)
NRBC # BLD: 0 /100 WBCS — SIGNIFICANT CHANGE UP (ref 0–0)
PLATELET # BLD AUTO: 288 K/UL — SIGNIFICANT CHANGE UP (ref 150–400)
RBC # BLD: 3.51 M/UL — LOW (ref 3.8–5.2)
RBC # FLD: 15 % — HIGH (ref 10.3–14.5)
WBC # BLD: 9.27 K/UL — SIGNIFICANT CHANGE UP (ref 3.8–10.5)
WBC # FLD AUTO: 9.27 K/UL — SIGNIFICANT CHANGE UP (ref 3.8–10.5)

## 2022-09-09 PROCEDURE — 59050 FETAL MONITOR W/REPORT: CPT

## 2022-09-09 PROCEDURE — 86769 SARS-COV-2 COVID-19 ANTIBODY: CPT

## 2022-09-09 PROCEDURE — 85460 HEMOGLOBIN FETAL: CPT

## 2022-09-09 PROCEDURE — 88307 TISSUE EXAM BY PATHOLOGIST: CPT

## 2022-09-09 PROCEDURE — 85025 COMPLETE CBC W/AUTO DIFF WBC: CPT

## 2022-09-09 PROCEDURE — 86900 BLOOD TYPING SEROLOGIC ABO: CPT

## 2022-09-09 PROCEDURE — 82962 GLUCOSE BLOOD TEST: CPT

## 2022-09-09 PROCEDURE — 87635 SARS-COV-2 COVID-19 AMP PRB: CPT

## 2022-09-09 PROCEDURE — 59025 FETAL NON-STRESS TEST: CPT

## 2022-09-09 PROCEDURE — 86901 BLOOD TYPING SEROLOGIC RH(D): CPT

## 2022-09-09 PROCEDURE — 86850 RBC ANTIBODY SCREEN: CPT

## 2022-09-09 PROCEDURE — 86780 TREPONEMA PALLIDUM: CPT

## 2022-09-09 RX ORDER — DIPHENHYDRAMINE HCL 50 MG
25 CAPSULE ORAL EVERY 6 HOURS
Refills: 0 | Status: DISCONTINUED | OUTPATIENT
Start: 2022-09-09 | End: 2022-09-09

## 2022-09-09 RX ORDER — IBUPROFEN 200 MG
1 TABLET ORAL
Qty: 0 | Refills: 0 | DISCHARGE
Start: 2022-09-09

## 2022-09-09 RX ORDER — ACETAMINOPHEN 500 MG
3 TABLET ORAL
Qty: 0 | Refills: 0 | DISCHARGE
Start: 2022-09-09

## 2022-09-09 RX ORDER — OXYCODONE HYDROCHLORIDE 5 MG/1
5 TABLET ORAL ONCE
Refills: 0 | Status: DISCONTINUED | OUTPATIENT
Start: 2022-09-09 | End: 2022-09-09

## 2022-09-09 RX ADMIN — OXYCODONE HYDROCHLORIDE 5 MILLIGRAM(S): 5 TABLET ORAL at 10:02

## 2022-09-09 RX ADMIN — OXYCODONE HYDROCHLORIDE 5 MILLIGRAM(S): 5 TABLET ORAL at 00:49

## 2022-09-09 RX ADMIN — Medication 600 MILLIGRAM(S): at 01:49

## 2022-09-09 RX ADMIN — Medication 975 MILLIGRAM(S): at 11:00

## 2022-09-09 RX ADMIN — Medication 975 MILLIGRAM(S): at 05:14

## 2022-09-09 RX ADMIN — Medication 975 MILLIGRAM(S): at 04:14

## 2022-09-09 RX ADMIN — OXYCODONE HYDROCHLORIDE 5 MILLIGRAM(S): 5 TABLET ORAL at 05:14

## 2022-09-09 RX ADMIN — Medication 600 MILLIGRAM(S): at 12:30

## 2022-09-09 RX ADMIN — Medication 100 MILLIGRAM(S): at 05:18

## 2022-09-09 RX ADMIN — Medication 600 MILLIGRAM(S): at 06:09

## 2022-09-09 RX ADMIN — Medication 600 MILLIGRAM(S): at 11:54

## 2022-09-09 RX ADMIN — Medication 975 MILLIGRAM(S): at 09:56

## 2022-09-09 RX ADMIN — Medication 25 MILLIGRAM(S): at 00:49

## 2022-09-09 RX ADMIN — OXYCODONE HYDROCHLORIDE 5 MILLIGRAM(S): 5 TABLET ORAL at 04:14

## 2022-09-09 RX ADMIN — HEPARIN SODIUM 5000 UNIT(S): 5000 INJECTION INTRAVENOUS; SUBCUTANEOUS at 06:09

## 2022-09-09 RX ADMIN — Medication 600 MILLIGRAM(S): at 06:57

## 2022-09-09 RX ADMIN — OXYCODONE HYDROCHLORIDE 5 MILLIGRAM(S): 5 TABLET ORAL at 01:49

## 2022-09-09 RX ADMIN — Medication 600 MILLIGRAM(S): at 00:49

## 2022-09-09 NOTE — DISCHARGE NOTE OB - NS MD DC FALL RISK RISK
For information on Fall & Injury Prevention, visit: [email or web address] OR  [email or web address] OR  https://www.cdc.gov/steadi/patient.html

## 2022-09-09 NOTE — DISCHARGE NOTE OB - MATERIALS PROVIDED
Breastfeeding Log/Guide to Postpartum Care/[facility] Hearing Screen Program/Back To Sleep Handout/Shaken Baby Prevention Handout/Breastfeeding Guide and Packet/Birth Certificate Instructions

## 2022-09-09 NOTE — PROGRESS NOTE ADULT - ASSESSMENT
A/P:  27y  POD # 3 S/P Delivery of TIUP  for baby A/ Emergent   section for baby B--> pt  doing well      Current Issues: none  PAST MEDICAL & SURGICAL HISTORY:  Anxiety      History of cholecystectomy

## 2022-09-09 NOTE — PROGRESS NOTE ADULT - PROBLEM SELECTOR PLAN 1
Increase OOB  Regular diet  PO Pain Protocol  Routine Postop/Postpartum care  RH neg --> will give rhogam if baby RH +  Discharge Planning      Ivis Zarate PA-C
Increase OOB  D/C IVF  Duramorph  DVT ppx  robins removed 8hr post-op  Regular diet  AM CBC  Routine Postpartum/Post-op care

## 2022-09-09 NOTE — DISCHARGE NOTE OB - CARE PLAN
Principal Discharge DX:	 (normal spontaneous vaginal delivery)  Assessment and plan of treatment:	Return to baseline health, regular diet, pain control. Follow up with Dr. Serrato.   1 Principal Discharge DX:	 delivery delivered  Assessment and plan of treatment:	Return to baseline health, regular diet, pain control. Follow up with Dr. Serrato.

## 2022-09-09 NOTE — DISCHARGE NOTE OB - PATIENT PORTAL LINK FT
You can access the FollowMyHealth Patient Portal offered by [facility] by registering at the following website: [email or web address]. By joining [facility]’s FollowMyHealth portal, you will also be able to view your health information using other applications (apps) compatible with our system.

## 2022-09-09 NOTE — DISCHARGE NOTE OB - CARE PROVIDER_API CALL
Joyce Serrato  OBSTETRICS AND GYNECOLOGY  [address], [address]  [place] [address]  Phone: [phone]  Fax: [phone]  Follow Up Time:

## 2022-09-09 NOTE — PROGRESS NOTE ADULT - SUBJECTIVE AND OBJECTIVE BOX
Day 1 of Anesthesia Pain Management Service    SUBJECTIVE:  Pain Scale Score:          [X] Refer to charted pain scores    THERAPY: Received PF spinal morphine as above    OBJECTIVE:    Sedation:        	[X] Alert	[ ] Drowsy	[ ] Arousable      [ ] Asleep       [ ] Unresponsive    Side Effects:	[X] None	[ ] Nausea	[ ] Vomiting         [ ] Pruritus  		[ ] Weakness            [ ] Numbness	          [ ] Other:    ASSESSMENT/ PLAN  [X] Patient transitioned to prn analgesics  [X] Pain management per primary service, pain service to sign off   [X]Documentation and Verification of current medications
Day 1 of Anesthesia Pain Management Service    SUBJECTIVE: Doing ok  Pain Scale Score:          [X] Refer to charted pain scores    THERAPY:  s/p   2  mg PF epidural morphine on 9\6\2022      MEDICATIONS  (STANDING):  acetaminophen     Tablet .. 975 milliGRAM(s) Oral <User Schedule>  ceFAZolin   IVPB      ceFAZolin   IVPB 2000 milliGRAM(s) IV Intermittent once  ceFAZolin   IVPB 2000 milliGRAM(s) IV Intermittent every 8 hours  diphtheria/tetanus/pertussis (acellular) Vaccine (ADAcel) 0.5 milliLiter(s) IntraMuscular once  escitalopram 20 milliGRAM(s) Oral at bedtime  heparin   Injectable 5000 Unit(s) SubCutaneous every 12 hours  ibuprofen  Tablet. 600 milliGRAM(s) Oral every 6 hours  ketorolac   Injectable 30 milliGRAM(s) IV Push every 6 hours  morphine PF Epidural 2 milliGRAM(s) Epidural once  oxytocin Infusion 333.333 milliUNIT(s)/Min (1000 mL/Hr) IV Continuous <Continuous>  oxytocin Infusion 333.333 milliUNIT(s)/Min (1000 mL/Hr) IV Continuous <Continuous>  terbutaline  Injectable 0.25 milliGRAM(s) SubCutaneous once    MEDICATIONS  (PRN):  dexAMETHasone  Injectable 4 milliGRAM(s) IV Push every 6 hours PRN Nausea  diphenhydrAMINE 25 milliGRAM(s) Oral every 6 hours PRN Pruritus  lanolin Ointment 1 Application(s) Topical every 6 hours PRN Sore Nipples  magnesium hydroxide Suspension 30 milliLiter(s) Oral two times a day PRN Constipation  nalbuphine Injectable 2.5 milliGRAM(s) IV Push every 6 hours PRN Pruritus  naloxone Injectable 0.1 milliGRAM(s) IV Push every 3 minutes PRN For ANY of the following changes in patient status:  A. Breaths Per Minute LESS THAN 10, B. Oxygen saturation LESS THAN 90%, C. Sedation score of 6 for Stop After: 4 Times  ondansetron Injectable 4 milliGRAM(s) IV Push every 6 hours PRN Nausea  oxyCODONE    IR 5 milliGRAM(s) Oral every 3 hours PRN Moderate to Severe Pain (4-10)  oxyCODONE    IR 5 milliGRAM(s) Oral once PRN Moderate to Severe Pain (4-10)  simethicone 80 milliGRAM(s) Chew every 4 hours PRN Gas      OBJECTIVE:    Sedation:        	[X] Alert	 [ ] Drowsy	[ ] Arousable      [ ] Asleep       [ ] Unresponsive    Side Effects:	[X] None 	[ ] Nausea	[ ] Vomiting         [ ] Pruritus  		[ ] Weakness            [ ] Numbness	          [ ] Other:    Vital Signs Last 24 Hrs  T(C): 36.7 (07 Sep 2022 04:37), Max: 37 (06 Sep 2022 10:28)  T(F): 98.1 (07 Sep 2022 04:37), Max: 98.6 (06 Sep 2022 10:28)  HR: 70 (07 Sep 2022 02:15) (56 - 97)  BP: 120/68 (07 Sep 2022 02:15) (114/88 - 153/80)  BP(mean): 87 (07 Sep 2022 01:31) (81 - 107)  RR: 17 (07 Sep 2022 04:37) (17 - 21)  SpO2: 98% (07 Sep 2022 04:37) (90% - 100%)    Parameters below as of 07 Sep 2022 04:37  Patient On (Oxygen Delivery Method): room air        ASSESSMENT/ PLAN  [X] Patient to be transitioned to prn analgesics after 24 hours  [X] Pain management per primary service, pain service to sign off   [X]Documentation and Verification of current medications
Postpartum Note-  Section POD#3    Allergies    No Known Allergies    Intolerances    Blood type O   Negative    RPR Negative    Rubella: Immune    S: Patient is a  27y     POD#3 S/P C/Sec  Subjective: Patient w/o complaints, pain is controlled.  Pt is OOB, tolerating PO, passing flatus, voiding. Lochia WNL.     Feeding: pumping  O:  Vital Signs Last 24 Hrs  T(C): 37.1 (09 Sep 2022 05:06), Max: 37.2 (08 Sep 2022 16:46)  T(F): 98.7 (09 Sep 2022 05:06), Max: 99 (08 Sep 2022 16:46)  HR: 82 (09 Sep 2022 05:06) (68 - 82)  BP: 137/78 (09 Sep 2022 05:06) (115/69 - 137/78)  RR: 18 (09 Sep 2022 05:06) (18 - 18)  SpO2: 97% (09 Sep 2022 05:06) (97% - 98%)      Gen: NAD  Abdomen: +BS Soft, nontender, non-distended, fundus firm.  Incision: Clean, dry, and intact.  Negative erythema/edema/ecchymosis   Sub Q/dermabond  Lochia WNL  Ext:   Neg Calf tenderness    LABS:                
 Postpartum Note-  Section POD#1 and  PP #1      Rubella IgG:     Immune                    RPR:                Negative       Blood Type:    O-    S:Patient is a  27y     POD#1 S/P   and primary  C/Sec for breech presentation and cord prolapse of twin B  Patient w/o complaints, pain is controlled.  Pt is OOB, tolerating PO, passing flatus. Lochia WNL.   Feeding: Breastfeeding    O:  Vital Signs Last 24 Hrs  T(C): 36.7 (07 Sep 2022 04:37), Max: 37 (06 Sep 2022 10:28)  T(F): 98.1 (07 Sep 2022 04:37), Max: 98.6 (06 Sep 2022 10:28)  HR: 70 (07 Sep 2022 02:15) (56 - 97)  BP: 120/68 (07 Sep 2022 02:15) (114/88 - 153/80)  BP(mean): 87 (07 Sep 2022 01:31) (81 - 107)  RR: 17 (07 Sep 2022 04:37) (17 - 21)  SpO2: 98% (07 Sep 2022 04:37) (90% - 100%)    Parameters below as of 07 Sep 2022 04:37  Patient On (Oxygen Delivery Method): room air      I&O's Summary    06 Sep 2022 07:01  -  07 Sep 2022 07:00  --------------------------------------------------------  IN: 2000 mL / OUT: 2350 mL / NET: -350 mL        Gen: NAD  CV: rrr s1s2, CTABL  Abdomen: Soft, nontender, non-distended, fundus firm.  Bowel sounds x 4 quadrants  Incision: Clean, dry, and intact.  Negative erythema/edema/ecchymosis   Sub Q  Perineum: second degree laceration  Lochia WNL  Ext: PAS in place. Negative Homans B/L.  Pedal pulses palpated B/L    LABS:                          10.9   18.89 )-----------( 255      ( 07 Sep 2022 05:57 )             34.0       A/P:  27y  POD # 1 S/P  and primary  section, doing well    PMHx: 2015- , FT, 7#14  2016- , FT 8#14  - , FT, 8#12  2014- SAB  Anxiety, Meds: Lexapro 20mg QD  PSHx: 2019- laparoscopic cholecystectomy    Current Issues: Anxiety currently on Lexapro 20 mg, social work consult    Increase OOB  D/C IVF  Duramorph  DVT ppx  robins removed 8hr post-op  Regular diet  AM CBC  Routine Postpartum/Post-op care          
Postpartum Note-  Section POD#2      Rubella:    immune                  RPR:     negative                  Blood Type: O negative    S:Patient is a 26 y/o P5 with Di-Di TIUP now POD#2 s/p  Twin A c/b cord prolapse of twin B s/p pLTCS   Subjective: Patient c/o mild incisional pain.  Pt is OOB, tolerating PO, passing flatus, and voiding. Lochia WNL.   Feeding: breastfeeding     O:  Vital Signs Last 24 Hrs  T(C): 36.7 (08 Sep 2022 04:53), Max: 37 (07 Sep 2022 09:44)  T(F): 98 (08 Sep 2022 04:53), Max: 98.6 (07 Sep 2022 09:44)  HR: 82 (08 Sep 2022 04:53) (64 - 89)  BP: 118/80 (08 Sep 2022 04:53) (110/68 - 120/75)  BP(mean): --  RR: 17 (08 Sep 2022 04:53) (17 - 18)  SpO2: 96% (08 Sep 2022 04:53) (96% - 100%)    Parameters below as of 08 Sep 2022 04:53  Patient On (Oxygen Delivery Method): room air          Gen: NAD  CV: rrr s1s2, CTABL  Abdomen: Soft, nontender, non distened, fundus firm.  Bowel Sounds x 4 quadrants  Incision: Clean, dry, and intact.  Negative erythema/edema/ecchymosis.   SubQ  Lochia WNL  Ext: Neg edema, Neg calf tenderness.  Pedal pulses palpated B/L    LABS:                          10.9   18.89 )-----------( 255      ( 07 Sep 2022 05:57 )             34.0

## 2022-09-09 NOTE — DISCHARGE NOTE OB - MEDICATION SUMMARY - MEDICATIONS TO TAKE
I will START or STAY ON the medications listed below when I get home from the hospital:    acetaminophen 325 mg oral tablet  -- 3 tab(s) by mouth every 6 hours  -- Indication: For MIld pain    ibuprofen 600 mg oral tablet  -- 1 tab(s) by mouth every 6 hours  -- Indication: For Cramping

## 2022-09-09 NOTE — DISCHARGE NOTE OB - HOSPITAL COURSE
Status post . Uncomplicated recovery. Follow up with Dr. Serrato. Status post C/S. Uncomplicated recovery. Follow up with Dr. Serrato.

## 2022-09-22 LAB
SURGICAL PATHOLOGY STUDY: SIGNIFICANT CHANGE UP

## 2022-10-26 NOTE — OB RN TRIAGE NOTE - NS_PRENATALLOC_OBGYN_ALL_OB
[FreeTextEntry1] : Kindly referred by Kristi Burgos for elevated creatinine. \par \par * DM controlled, HGA1c 7. * CKD improved, creatinine 1.19. High of 1.8 in February. * Calcium normalized at 9.3. * \par * BP controlled. No hypotension. * No albumin. \par \par Previous history (26May22): * Creatinine decreased to 1.4 on 5/03  1.21 on 5/10. She has increased her hydration. * Calcium decreased to 9.6.  * DM controlled, HGA1c 6.3. *  with Dr. Azul today. \par \par Previous history (03May22): Labs reviewed. Creatinine was 0.86 on Nov 21 and increased to 1.8 on 28Feb22 with calcium of 10.6. She felt that she had large ileostomy output and wasn't drinking enough and has now increased fluid intake. She takes nexium only several times weekly (and will not stop). The patient denies exposure to chronic NSAIDs, green smoothies, creatine, or herbal supplements. The patient denies a history of kidney stones or pyelonephritis. No significant nocturia. No recent renal ultrasound. They are unaware of proteinuria or hematuria.\par \par No illness (including Covid) since November 2021. No change in medications. \par \par She has a known right renal cyst. This is reportedly benign. \par \par On metformin (which she will now stop). \par \par Calcium 10.6. She has been treated with vitamin D (but not calcium carbonate). \par  \par 
MD Office

## 2022-10-28 NOTE — OB PROVIDER H&P - NSFIRSTDATEVISIT_OBGYN_ALL_OB
Patient discharging home today, discharge teaching completed with patient and wife, all questions answered. Medications delivered to room. Patient sent with all belongings.   Unknown

## 2022-11-08 ENCOUNTER — APPOINTMENT (OUTPATIENT)
Dept: MATERNAL FETAL MEDICINE | Facility: CLINIC | Age: 27
End: 2022-11-08

## 2022-11-08 PROCEDURE — G0109 DIAB MANAGE TRN IND/GROUP: CPT | Mod: 95

## 2022-12-05 ENCOUNTER — RX RENEWAL (OUTPATIENT)
Age: 27
End: 2022-12-05

## 2023-11-15 RX ORDER — ESCITALOPRAM OXALATE 10 MG/1
1 TABLET, FILM COATED ORAL
Qty: 0 | Refills: 0 | DISCHARGE

## 2024-01-24 PROBLEM — F41.9 ANXIETY DISORDER, UNSPECIFIED: Chronic | Status: ACTIVE | Noted: 2022-09-06

## 2024-05-02 ENCOUNTER — APPOINTMENT (OUTPATIENT)
Dept: ENDOCRINOLOGY | Facility: CLINIC | Age: 29
End: 2024-05-02
Payer: MEDICAID

## 2024-05-02 ENCOUNTER — LABORATORY RESULT (OUTPATIENT)
Age: 29
End: 2024-05-02

## 2024-05-02 VITALS
SYSTOLIC BLOOD PRESSURE: 148 MMHG | BODY MASS INDEX: 43.4 KG/M2 | WEIGHT: 293 LBS | DIASTOLIC BLOOD PRESSURE: 81 MMHG | HEART RATE: 80 BPM | HEIGHT: 69 IN

## 2024-05-02 DIAGNOSIS — E03.9 HYPOTHYROIDISM, UNSPECIFIED: ICD-10-CM

## 2024-05-02 DIAGNOSIS — Z83.49 FAMILY HISTORY OF OTHER ENDOCRINE, NUTRITIONAL AND METABOLIC DISEASES: ICD-10-CM

## 2024-05-02 DIAGNOSIS — E83.52 HYPERCALCEMIA: ICD-10-CM

## 2024-05-02 DIAGNOSIS — F31.60 BIPOLAR DISORDER, CURRENT EPISODE MIXED, UNSPECIFIED: ICD-10-CM

## 2024-05-02 PROCEDURE — 99204 OFFICE O/P NEW MOD 45 MIN: CPT | Mod: 25

## 2024-05-02 RX ORDER — BLOOD-GLUCOSE METER
W/DEVICE KIT MISCELLANEOUS
Qty: 1 | Refills: 0 | Status: DISCONTINUED | COMMUNITY
Start: 2022-07-29 | End: 2024-05-02

## 2024-05-02 RX ORDER — LANCETS 33 GAUGE
EACH MISCELLANEOUS
Qty: 4 | Refills: 2 | Status: DISCONTINUED | COMMUNITY
Start: 2022-07-29 | End: 2024-05-02

## 2024-05-02 RX ORDER — URINE ACETONE TEST STRIPS
STRIP MISCELLANEOUS
Qty: 1 | Refills: 0 | Status: DISCONTINUED | COMMUNITY
Start: 2022-07-29 | End: 2024-05-02

## 2024-05-02 RX ORDER — ESCITALOPRAM OXALATE 5 MG/1
TABLET, FILM COATED ORAL
Refills: 0 | Status: ACTIVE | COMMUNITY

## 2024-05-02 RX ORDER — OLANZAPINE 20 MG/1
TABLET ORAL
Refills: 0 | Status: ACTIVE | COMMUNITY

## 2024-05-02 RX ORDER — BLOOD-GLUCOSE METER
70 EACH MISCELLANEOUS
Qty: 400 | Refills: 5 | Status: DISCONTINUED | COMMUNITY
Start: 2022-12-05 | End: 2024-05-02

## 2024-05-02 RX ORDER — LITHIUM CARBONATE 300 MG/1
TABLET ORAL
Refills: 0 | Status: ACTIVE | COMMUNITY

## 2024-05-02 RX ORDER — BLOOD-GLUCOSE METER
KIT MISCELLANEOUS 4 TIMES DAILY
Qty: 4 | Refills: 2 | Status: DISCONTINUED | COMMUNITY
Start: 2022-07-29 | End: 2024-05-02

## 2024-05-03 LAB — TSH SERPL-ACNC: 47.9 UIU/ML

## 2024-05-03 RX ORDER — LEVOTHYROXINE SODIUM 0.17 MG/1
175 TABLET ORAL
Qty: 90 | Refills: 1 | Status: ACTIVE | COMMUNITY
Start: 1900-01-01 | End: 1900-01-01

## 2024-05-03 NOTE — HISTORY OF PRESENT ILLNESS
[FreeTextEntry1] : Ms. Barry is a 29 year-old woman presenting for a second opinion regarding management of hypercalcemia and hypothyroidism.  She is accompanied by her .   Hypercalcemia.  She was diagnosed with hypercalcemia in August 2023. She has been on lithium for approximately 18 months and has been tapering down, with plan to discontinue next week. No history of hypercalcemia prior to initiation of lithium therapy. She has had hypercalcemia with inappropriately normal PTH concentrations, ranging from 25 to 48 pg/mL (normal: 16-77). Last laboratory results from February 2024 significant for: calcium 11.7 mg/dL (albumin 4.5 g/dL; normal: 8.6-10.2), BUN/creatinine 9/0.52 mg/dL (eGFR 128 mL/min), alkaline phosphatase 86 U/L, 25-hydroxyvitamin D 20 ng/mL. 24 hour urine calcium 20 mg (normal: ) in November 2023. 1,25-dihydroxyvitamin D 134 pg/mL (normal: 18-72 pg/mL) in January 2024.  No history of nephrolithiasis. No recent renal imaging. No history of fracture. No history of bone density testing. No history of radiation exposure. No history of hydrochlorothiazide therapy. She has milk in coffee and rare yogurt. She is not taking calcium or vitamin D supplements or a multivitamin.  Father with history of primary hyperparathyroidism. No other family history of endocrine tumors.   Hypothyroidism. She was diagnosed with hypothyroidism in August 2023. She has been taking levothyroxine 100 mcg daily, adjusted from 75 mcg daily in January 2024 for TSH 9.47 uIU/mL (normal: 0.40-4.50). She is taking levothyroxine in the morning, on an empty stomach, with plain water, and waiting at least 30 minutes before eating. She is not taking calcium/iron/multivitamin.  No history of radiation exposure. Mother and sister with history of hypothyroidism.

## 2024-05-03 NOTE — ASSESSMENT
[FreeTextEntry1] : Hypercalcemia. She was first noted to have hypercalcemia in August 2023. She has hypercalcemia with inappropriately normal PTH concentrations consistent with lithium use or primary hyperparathyroidism. She has a history of very low urine calcium, more consistent with a lithium effect. Lithium alters feedback mechanisms within the parathyroid gland, increasing the set point at which calcium suppresses PTH secretion. It also increases calcium reabsorption within the loop of Henle, decreasing urinary calcium excretion. Serum calcium remains within the normal range for most patients. While estimates vary, up to 20% of patients on lithium develop hypercalcemia. She is tapering off lithium and we will monitor. We can consider a renal ultrasound and bone density testing for further evaluation next visit if hypercalcemia persists after discontinuation of lithium therapy. We discussed the recommendations for calcium and vitamin D intake; there is no indication to restrict calcium intake in patients with primary hyperparathyroidism. Monitor calciotropic panel after cessation of lithium use Calcium 1000 mg daily from diet and supplements (to be taken in divided doses as no more than 500-600 mg can be absorbed at one time); advised increased dietary and/or supplemental calcium Vitamin D at least 2000 intl units daily   Hypothyroidism. Her dose of levothyroxine was recently adjusted for biochemical hypothyroidism. We have reviewed proper use and compliance with levothyroxine. Continue levothyroxine 100 mcg daily pending TSH for goal 0.5-2.5 uIU/mL   Return to see me in 4 months.

## 2024-05-03 NOTE — DATA REVIEWED
[FreeTextEntry1] : Summarized as per HPI. I reviewed 16 pages of previous records and laboratory results.

## 2024-05-03 NOTE — PHYSICAL EXAM
[Alert] : alert [Healthy Appearance] : healthy appearance [No Acute Distress] : no acute distress [Normal Sclera/Conjunctiva] : normal sclera/conjunctiva [Normal Hearing] : hearing was normal [No Neck Mass] : no neck mass was observed [No LAD] : no lymphadenopathy [Supple] : the neck was supple [Thyroid Not Enlarged] : the thyroid was not enlarged [No Respiratory Distress] : no respiratory distress [Clear to Auscultation] : lungs were clear to auscultation bilaterally [Normal S1, S2] : normal S1 and S2 [Normal Rate] : heart rate was normal [Regular Rhythm] : with a regular rhythm [No Spinal Tenderness] : no spinal tenderness [No Stigmata of Cushings Syndrome] : no stigmata of Cushings Syndrome [Normal Gait] : normal gait [Normal Insight/Judgement] : insight and judgment were intact [Kyphosis] : no kyphosis present [Scoliosis] : no scoliosis [Acanthosis Nigricans] : no acanthosis nigricans [de-identified] : no moon facies, no supraclavicular fat pads

## 2024-05-03 NOTE — ADDENDUM
[FreeTextEntry1] : Recent laboratory results as below; discussed with MsHalima Barry. Her weight-based dose of levothyroxine is approximately 230 mcg. I recommended an adjustment in levothyroxine from 100 to 175 mcg. We will plan to monitor TSH with next blood tests. 5/03/24

## 2024-08-20 NOTE — OB PROVIDER H&P - PRETERM DELIVERIES, OB PROFILE
CALORIE COUNT      Approximate Oral Intake for:   8/19/24   Calories: 1261  Protein: 55 g      Estimated Needs:    Calories: 9171-3338+  Protein: 109-145 g      Summary:    Met with pt at bedside this AM. Pt reports taking nutrition supplements magic cup, expedite, ensure enlive. Pt reports preference for Berry Magic cup, likes all flavors.    Pt taking  % of supplements 8/19. Pt ordering x3 meals/day.   Pt meeting <70% calorie needs and 50% protein needs 8/19/24.   Encouraged nutrient dense foods, adequate calories and protein to meet nutrition needs.    1

## 2024-09-10 ENCOUNTER — APPOINTMENT (OUTPATIENT)
Dept: ENDOCRINOLOGY | Facility: CLINIC | Age: 29
End: 2024-09-10
Payer: MEDICAID

## 2024-09-10 VITALS — DIASTOLIC BLOOD PRESSURE: 88 MMHG | HEART RATE: 74 BPM | SYSTOLIC BLOOD PRESSURE: 138 MMHG

## 2024-09-10 DIAGNOSIS — E66.01 MORBID (SEVERE) OBESITY DUE TO EXCESS CALORIES: ICD-10-CM

## 2024-09-10 DIAGNOSIS — E03.9 HYPOTHYROIDISM, UNSPECIFIED: ICD-10-CM

## 2024-09-10 DIAGNOSIS — E21.3 HYPERPARATHYROIDISM, UNSPECIFIED: ICD-10-CM

## 2024-09-10 PROCEDURE — G2211 COMPLEX E/M VISIT ADD ON: CPT | Mod: NC

## 2024-09-10 PROCEDURE — 99214 OFFICE O/P EST MOD 30 MIN: CPT

## 2024-09-10 RX ORDER — CALCIUM CITRATE/VITAMIN D3 315MG-6.25
TABLET ORAL
Refills: 0 | Status: ACTIVE | COMMUNITY

## 2024-09-10 RX ORDER — LAMOTRIGINE 25 MG/1
TABLET ORAL
Refills: 0 | Status: ACTIVE | COMMUNITY

## 2024-09-10 RX ORDER — CARIPRAZINE 6 MG/1
CAPSULE, GELATIN COATED ORAL
Refills: 0 | Status: ACTIVE | COMMUNITY

## 2024-09-10 NOTE — DATA REVIEWED
[FreeTextEntry1] : Laboratories (September 3, 2024) reviewed and significant for:  Calcium 10.8 mg/dL (albumin 4.2 g/dL) Ionized calcium 5.6 mg/dL PTH 35 pg/mL 25-hydroxyvitamin D 37 ng/mL BUN/creatinine 10/0.56 mg/dL (eGFR 125 mL/min) Alkaline phosphatase 78 U/L Phosphorus 3.8 mg/dL Magnesium 1.8 mg/dL TSH 4.89 uIU/mL (normal: 0.40-4.50)

## 2024-09-10 NOTE — ASSESSMENT
[FreeTextEntry1] : Hypercalcemia. She was first noted to have hypercalcemia in August 2023. She has hypercalcemia with inappropriately normal PTH concentrations consistent with lithium use or primary hyperparathyroidism. She has a history of very low urine calcium, more consistent with a lithium effect. Lithium alters feedback mechanisms within the parathyroid gland, increasing the set point at which calcium suppresses PTH secretion. It also increases calcium reabsorption within the loop of Henle, decreasing urinary calcium excretion. Serum calcium remains within the normal range for most patients. While estimates vary, up to 20% of patients on lithium develop hypercalcemia. She has tapered off lithium and we will monitor. She is taking a high dose of calcium and I advised she decrease. We will send a renal ultrasound and bone density testing for further evaluation. We discussed the recommendations for calcium and vitamin D intake; there is no indication to restrict calcium intake in patients with primary hyperparathyroidism. We will consider genetic testing and referral for parathyroid surgery if serum calcium remains elevated next visit.  Calcium 1000 mg daily from diet and supplements (to be taken in divided doses as no more than 500-600 mg can be absorbed at one time); advised decreased dietary and/or supplemental calcium Continue vitamin D supplementation to maintain 25-hydroxyvitamin D 30-50 ng/mL  Hypothyroidism. She remains biochemically hypothyroid after levothyroxine dose adjustment last visit. We have reviewed proper use and compliance with levothyroxine. Adjust levothyroxine to 200 mcg daily for goal TSH 0.5-2.5 uIU/mL  Elevated body mass index. Unfortunately, pharmacologic options for weight loss are not covered by her insurance. I recommended consideration of surgical options for weight loss.   Return to see me in 4-6 months. 
Spontaneous, unlabored and symmetrical

## 2024-09-10 NOTE — HISTORY OF PRESENT ILLNESS
[FreeTextEntry1] : Ms. Barry is a 29 year-old woman presenting for follow-up of hypercalcemia and hypothyroidism. I saw her for an initial visit in May 2024. She is accompanied by her .   Hypercalcemia.  She was diagnosed with hypercalcemia in August 2023. She had been on lithium for approximately 18 months and had been tapering down, discontinuing therapy in May 2024. No history of hypercalcemia prior to initiation of lithium therapy. She has had hypercalcemia with inappropriately normal PTH concentrations, ranging from 25 to 48 pg/mL (normal: 16-77). Laboratory results from February 2024 significant for: calcium 11.7 mg/dL (albumin 4.5 g/dL; normal: 8.6-10.2), BUN/creatinine 9/0.52 mg/dL (eGFR 128 mL/min), alkaline phosphatase 86 U/L, 25-hydroxyvitamin D 20 ng/mL. 24 hour urine calcium 20 mg (normal: ) in November 2023. 1,25-dihydroxyvitamin D 134 pg/mL (normal: 18-72 pg/mL) in January 2024.  No history of nephrolithiasis. No recent renal imaging. She has a history of a right tibia fracture in 2024 in a fall into a pothole. No history of bone density testing. No history of radiation exposure. No history of hydrochlorothiazide therapy. She has milk in coffee and rare yogurt. She is taking [employer] calcium 1110 mg with vitamin D 1600 intl units daily.  Father with history of primary hyperparathyroidism. No other family history of endocrine tumors.   Hypothyroidism. She was diagnosed with hypothyroidism in August 2023. She had been taking levothyroxine 100 mcg daily at her initial visit, adjusted from 75 mcg daily in January 2024 for TSH 9.47 uIU/mL (normal: 0.40-4.50). In May 2024 we adjusted levothyroxine to 175 mcg daily for TSH 47.90 uIU/mL (normal: 0.27-4.20).  She is taking levothyroxine in the morning, on an empty stomach, with plain water, and waiting at least 30 minutes before eating. She is taking a calcium supplement and  from levothyroxine by at least four hours.  No history of radiation exposure. Mother and sister with history of hypothyroidism.  Interim History  She has been off lithium since May.  In May we adjusted levothyroxine from 100 to 175 mcg daily.  Recent laboratory results significant for the below; see scanned results. Serum calcium 10.8 mg/dL (albumin 4.2 g/dL). PTH 35 pg/mL. Renal function within range. TSH 4.89 uIU/mL (normal: 0.40-4.50). 25-hydroxyvitamin D 37 ng/mL.  Medical and surgical history, medications, allergies, social and family history reviewed and updated as needed.

## 2024-09-10 NOTE — PHYSICAL EXAM
[Alert] : alert [Healthy Appearance] : healthy appearance [No Acute Distress] : no acute distress [Normal Sclera/Conjunctiva] : normal sclera/conjunctiva [Normal Hearing] : hearing was normal [No Respiratory Distress] : no respiratory distress [No Spinal Tenderness] : no spinal tenderness [No Stigmata of Cushings Syndrome] : no stigmata of Cushings Syndrome [Normal Gait] : normal gait [Normal Insight/Judgement] : insight and judgment were intact [Kyphosis] : no kyphosis present [Scoliosis] : no scoliosis [Acanthosis Nigricans] : no acanthosis nigricans [de-identified] : no moon facies, no supraclavicular fat pads

## 2025-03-18 ENCOUNTER — APPOINTMENT (OUTPATIENT)
Dept: ENDOCRINOLOGY | Facility: CLINIC | Age: 30
End: 2025-03-18
Payer: MEDICAID

## 2025-03-18 VITALS
HEART RATE: 84 BPM | SYSTOLIC BLOOD PRESSURE: 128 MMHG | HEIGHT: 69 IN | WEIGHT: 293 LBS | DIASTOLIC BLOOD PRESSURE: 82 MMHG | BODY MASS INDEX: 43.4 KG/M2

## 2025-03-18 DIAGNOSIS — E21.3 HYPERPARATHYROIDISM, UNSPECIFIED: ICD-10-CM

## 2025-03-18 DIAGNOSIS — E03.9 HYPOTHYROIDISM, UNSPECIFIED: ICD-10-CM

## 2025-03-18 PROCEDURE — 99214 OFFICE O/P EST MOD 30 MIN: CPT

## 2025-03-18 PROCEDURE — G2211 COMPLEX E/M VISIT ADD ON: CPT | Mod: NC

## 2025-03-18 RX ORDER — OLANZAPINE 20 MG/1
TABLET ORAL
Refills: 0 | Status: ACTIVE | COMMUNITY